# Patient Record
Sex: MALE | Race: WHITE
[De-identification: names, ages, dates, MRNs, and addresses within clinical notes are randomized per-mention and may not be internally consistent; named-entity substitution may affect disease eponyms.]

---

## 2018-11-11 ENCOUNTER — HOSPITAL ENCOUNTER (EMERGENCY)
Dept: HOSPITAL 62 - ER | Age: 23
Discharge: HOME | End: 2018-11-11
Payer: OTHER GOVERNMENT

## 2018-11-11 VITALS — SYSTOLIC BLOOD PRESSURE: 155 MMHG | DIASTOLIC BLOOD PRESSURE: 73 MMHG

## 2018-11-11 DIAGNOSIS — S01.85XA: Primary | ICD-10-CM
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
## 2018-11-11 PROCEDURE — 12013 RPR F/E/E/N/L/M 2.6-5.0 CM: CPT

## 2018-11-11 PROCEDURE — 99283 EMERGENCY DEPT VISIT LOW MDM: CPT

## 2018-11-11 NOTE — ER DOCUMENT REPORT
Doctor's Note


Notes: 





I personally and independently obtained patient history and examined the 

patient in conjunction with the APC and agree with the assessment, treatment 

plan and disposition of the patient as recorded by the APC, and have reviewed 

the APC's note.





HISTORY OF PRESENT ILLNESS:


Patient is a 23-year-old male that presents to the emergency department for 

chief complaint of dog bite to the face.





ROS:





Other than noted above, the 12 point review of systems was reviewed with the 

patient and were negative, all pertinent findings are included in the HPI.





PHYSICAL EXAMINATION:





Vital signs reviewed, nursing noted reviewed. 





GENERAL: Well-appearing, well-nourished and in no acute distress.





HEAD:  normocephalic.  There is a linear laceration to the right chin, its deep 

to the subcutaneous tissues, there is also some small bite marks, underneath 

the chin





EYES: Eyes appear normal, conjunctiva are normal.





ENT:   Moist mucous membranes.





NECK: Normal range of motion, supple without lymphadenopathy





LUNGS: No respiratory distress





HEART: Regular rate and rhythm without murmurs





EXTREMITIES: Nontender, good range of motion, no pitting or edema.  





NEUROLOGICAL: Moves all extremities spontaneously Motor and sensory grossly 

intact on exam.





PSYCH: Normal mood, normal affect.





SKIN: Warm, Dry, normal turgor, 





MEDICAL DECISION MAKING:





Patient's wound was examined, I feel it is deep enough, that it should be 

approximated, as wound healing secondarily would be long, and would heal poorly 

with poor cosmesis.  Advised to lose approximation, and discharged on Augmentin 

for antibiotic prophylaxis.





Please review detail APC documentation. 





*Note is created using voice recognition software and may contain spelling, 

syntax or grammatical errors.
